# Patient Record
Sex: FEMALE | Race: WHITE | NOT HISPANIC OR LATINO | URBAN - METROPOLITAN AREA
[De-identification: names, ages, dates, MRNs, and addresses within clinical notes are randomized per-mention and may not be internally consistent; named-entity substitution may affect disease eponyms.]

---

## 2018-07-06 ENCOUNTER — OFFICE VISIT - HEALTHEAST (OUTPATIENT)
Dept: SURGERY | Facility: CLINIC | Age: 58
End: 2018-07-06

## 2018-07-06 DIAGNOSIS — K42.9 UMBILICAL HERNIA WITHOUT OBSTRUCTION AND WITHOUT GANGRENE: ICD-10-CM

## 2018-07-06 RX ORDER — LEVOTHYROXINE SODIUM 100 UG/1
TABLET ORAL
Refills: 3 | Status: SHIPPED | COMMUNITY
Start: 2018-06-18

## 2018-07-06 ASSESSMENT — MIFFLIN-ST. JEOR: SCORE: 1591.71

## 2018-07-24 ENCOUNTER — ANESTHESIA - HEALTHEAST (OUTPATIENT)
Dept: SURGERY | Facility: AMBULATORY SURGERY CENTER | Age: 58
End: 2018-07-24

## 2018-07-24 ASSESSMENT — MIFFLIN-ST. JEOR: SCORE: 1573.79

## 2018-07-26 ENCOUNTER — SURGERY - HEALTHEAST (OUTPATIENT)
Dept: SURGERY | Facility: AMBULATORY SURGERY CENTER | Age: 58
End: 2018-07-26

## 2018-07-26 ASSESSMENT — MIFFLIN-ST. JEOR: SCORE: 1573.79

## 2018-08-13 ENCOUNTER — OFFICE VISIT - HEALTHEAST (OUTPATIENT)
Dept: SURGERY | Facility: CLINIC | Age: 58
End: 2018-08-13

## 2018-08-13 DIAGNOSIS — K42.9 UMBILICAL HERNIA WITHOUT OBSTRUCTION AND WITHOUT GANGRENE: ICD-10-CM

## 2018-08-13 ASSESSMENT — MIFFLIN-ST. JEOR: SCORE: 1573.79

## 2021-05-25 ENCOUNTER — RECORDS - HEALTHEAST (OUTPATIENT)
Dept: ADMINISTRATIVE | Facility: CLINIC | Age: 61
End: 2021-05-25

## 2021-06-01 ENCOUNTER — RECORDS - HEALTHEAST (OUTPATIENT)
Dept: ADMINISTRATIVE | Facility: CLINIC | Age: 61
End: 2021-06-01

## 2021-06-01 VITALS — HEIGHT: 65 IN | BODY MASS INDEX: 36.65 KG/M2 | WEIGHT: 220 LBS

## 2021-06-01 VITALS — WEIGHT: 220 LBS | HEIGHT: 65 IN | BODY MASS INDEX: 36.65 KG/M2

## 2021-06-01 VITALS — HEIGHT: 67 IN | WEIGHT: 218.7 LBS | BODY MASS INDEX: 34.33 KG/M2

## 2021-06-19 NOTE — ANESTHESIA PREPROCEDURE EVALUATION
Anesthesia Evaluation      Patient summary reviewed   History of anesthetic complications     Airway   Mallampati: II  Neck ROM: full   Pulmonary - normal exam    breath sounds clear to auscultation  (+) a smoker  (-) shortness of breath, sleep apnea                         Cardiovascular   Exercise tolerance: > or = 4 METS  (-) angina, murmur  ECG reviewed  Rhythm: regular  Rate: normal,    no murmur      Neuro/Psych - negative ROS     Endo/Other    (+) hypothyroidism,      GI/Hepatic/Renal - negative ROS           Dental - normal exam                        Anesthesia Plan  Planned anesthetic: general endotracheal    ASA 2   Induction: intravenous   Anesthetic plan and risks discussed with: patient  Anesthesia plan special considerations: antiemetics,   Post-op plan: routine recovery

## 2021-06-19 NOTE — ANESTHESIA CARE TRANSFER NOTE
Last vitals:   Vitals:    07/26/18 1024   BP: (P) 129/59   Pulse: (P) 75   Resp: (P) 18   Temp: (P) 37  C (98.6  F)   SpO2: (P) 100%   In OR, spont respir, , GAVIN, sustained head lift, sx and extubated to 02 via FM, spont respir, transported to PACU, VSS, report t RN.  Patient's level of consciousness is drowsy  Spontaneous respirations: yes  Maintains airway independently: yes  Dentition unchanged: yes  Oropharynx: oropharynx clear of all foreign objects    QCDR Measures:  ASA# 20 - Surgical Safety Checklist: WHO surgical safety checklist completed prior to induction  PQRS# 430 - Adult PONV Prevention: 4558F - Pt received => 2 anti-emetic agents (different classes) preop & intraop  ASA# 8 - Peds PONV Prevention: NA - Not pediatric patient, not GA or 2 or more risk factors NOT present  PQRS# 424 - Alicia-op Temp Management: 4559F - At least one body temp DOCUMENTED => 35.5C or 95.9F within required timeframe  PQRS# 426 - PACU Transfer Protocol: - Transfer of care checklist used  ASA# 14 - Acute Post-op Pain: ASA14B - Patient did NOT experience pain >= 7 out of 10

## 2021-06-19 NOTE — ANESTHESIA POSTPROCEDURE EVALUATION
Patient: Kate Thomas  TOTAL LAPAROSCOPIC HYSTERECTOMY BILATERAL SALPINGO OOPHORECTOMY, REPAIR, HERNIA, UMBILICAL, OPEN, REPAIR, HERNIA, UMBILICAL, OPEN  Anesthesia type: general    Patient location: PACU  Last vitals:   Vitals:    07/26/18 1100   BP:    Pulse: 66   Resp: 18   Temp:    SpO2: 100%     Post vital signs: stable  Level of consciousness: awake and responds to simple questions  Post-anesthesia pain: pain controlled  Post-anesthesia nausea and vomiting: no  Pulmonary: unassisted, return to baseline  Cardiovascular: stable and blood pressure at baseline  Hydration: adequate  Anesthetic events: no    QCDR Measures:  ASA# 11 - Alicia-op Cardiac Arrest: ASA11B - Patient did NOT experience unanticipated cardiac arrest  ASA# 12 - Alicia-op Mortality Rate: ASA12B - Patient did NOT die  ASA# 13 - PACU Re-Intubation Rate: ASA13B - Patient did NOT require a new airway mgmt  ASA# 10 - Composite Anes Safety: ASA10A - No serious adverse event    Additional Notes:

## 2021-06-19 NOTE — PROGRESS NOTES
"HPI:  This is a 58 y.o. female here today with concerns of pain and bulging in her umbilicus area. She has noted this for the past few month(s). The symptoms have progressed and increased over this time. She comes in for evaluation secondary to the hernia causing enough issues to bother them with daily activities or chores.    Allergies:Review of patient's allergies indicates no known allergies.    History reviewed. No pertinent past medical history.    History reviewed. No pertinent surgical history.    CURRENT MEDS:      History reviewed. No pertinent family history.     reports that she has never smoked. She has never used smokeless tobacco. She reports that she does not drink alcohol or use illicit drugs.    Review of Systems - Negative except umbilical bulge and pain. Otherwise twelve system of review is negative.      Vitals:    07/06/18 1254   BP: 134/85   Patient Site: Right Arm   Patient Position: Sitting   Cuff Size: Adult Large   Pulse: 77   SpO2: 99%   Weight: 218 lb 11.2 oz (99.2 kg)   Height: 5' 6.5\" (1.689 m)       Body mass index is 34.77 kg/(m^2).    EXAM:  General: NAD   HEENT: normocephalic, PERRLA and EOMS intact  Mounth: Mucus membranes moist  Neck: Supple  Chest: Clear to auscultation bilaterally  CV: RRR  ABD: Soft nontender and nondistended, umbilical hernia, reducible  EXT: Warm, pulses intact,   Neuro: Alert and oriented x3  Back: no CVA tenderness    Assessment/Plan: Pt with a umbilical hernia. I discussed this at length with She.  I went over conservative management as well as surgical treatment of this.. I would plan on doing this via anopen  approach with possible use of mesh. I went over the small risks of surgery including but not limited to bleeding and infection, anesthesia, recurrence rates and nerve injury. I discussed the expected recovery time as well. Will get this scheduled. She will contact us to have this scheduled.      Tone Robertson MD  [facility] Department of " Surgery

## 2021-06-20 NOTE — PROGRESS NOTES
"HPI: Pt is here for follow up of a umbilical hernia repair.  She is doing well. Her appetite is good, and bowel function regular.  No fevers or chills. Ambulating without problems.       BP (!) 145/96 (Patient Site: Right Arm, Patient Position: Sitting, Cuff Size: Adult Large)  Pulse 77  Ht 5' 5\" (1.651 m)  Wt 220 lb (99.8 kg)  SpO2 99%  BMI 36.61 kg/m2      EXAM: This is a  58 y.o. WOMAN in no distress  GENERAL: Appears well  CHEST clear  CVS S1S2 NSR  ABDOMEN: Soft, non-tender. Incision healed nicely  EXT: warm, moves without difficulty      Assessment/Plan:   S/P umbilical hernia repair, with Dr. New  Return to normal activities and work.  Return if issues,   Answered all questions today.    Tone Robertson MD  [facility] Department of Surgery  "